# Patient Record
Sex: FEMALE | Race: WHITE | NOT HISPANIC OR LATINO | Employment: FULL TIME | URBAN - METROPOLITAN AREA
[De-identification: names, ages, dates, MRNs, and addresses within clinical notes are randomized per-mention and may not be internally consistent; named-entity substitution may affect disease eponyms.]

---

## 2023-05-12 DIAGNOSIS — Z00.00 ANNUAL PHYSICAL EXAM: ICD-10-CM

## 2023-05-13 ENCOUNTER — LAB (OUTPATIENT)
Dept: LAB | Facility: LAB | Age: 53
End: 2023-05-13
Payer: COMMERCIAL

## 2023-05-13 DIAGNOSIS — Z00.00 ANNUAL PHYSICAL EXAM: ICD-10-CM

## 2023-05-13 PROCEDURE — 80061 LIPID PANEL: CPT

## 2023-05-13 PROCEDURE — 36415 COLL VENOUS BLD VENIPUNCTURE: CPT

## 2023-05-13 PROCEDURE — 85025 COMPLETE CBC W/AUTO DIFF WBC: CPT

## 2023-05-13 PROCEDURE — 80053 COMPREHEN METABOLIC PANEL: CPT

## 2023-05-13 PROCEDURE — 84443 ASSAY THYROID STIM HORMONE: CPT

## 2023-05-14 LAB
ALANINE AMINOTRANSFERASE (SGPT) (U/L) IN SER/PLAS: 12 U/L (ref 7–45)
ALBUMIN (G/DL) IN SER/PLAS: 4.3 G/DL (ref 3.4–5)
ALKALINE PHOSPHATASE (U/L) IN SER/PLAS: 46 U/L (ref 33–110)
ANION GAP IN SER/PLAS: 10 MMOL/L (ref 10–20)
ASPARTATE AMINOTRANSFERASE (SGOT) (U/L) IN SER/PLAS: 19 U/L (ref 9–39)
BASOPHILS (10*3/UL) IN BLOOD BY AUTOMATED COUNT: 0.06 X10E9/L (ref 0–0.1)
BASOPHILS/100 LEUKOCYTES IN BLOOD BY AUTOMATED COUNT: 0.9 % (ref 0–2)
BILIRUBIN TOTAL (MG/DL) IN SER/PLAS: 0.5 MG/DL (ref 0–1.2)
CALCIUM (MG/DL) IN SER/PLAS: 9.8 MG/DL (ref 8.6–10.6)
CARBON DIOXIDE, TOTAL (MMOL/L) IN SER/PLAS: 29 MMOL/L (ref 21–32)
CHLORIDE (MMOL/L) IN SER/PLAS: 104 MMOL/L (ref 98–107)
CHOLESTEROL (MG/DL) IN SER/PLAS: 216 MG/DL (ref 0–199)
CHOLESTEROL IN HDL (MG/DL) IN SER/PLAS: 62.7 MG/DL
CHOLESTEROL/HDL RATIO: 3.4
CREATININE (MG/DL) IN SER/PLAS: 0.9 MG/DL (ref 0.5–1.05)
EOSINOPHILS (10*3/UL) IN BLOOD BY AUTOMATED COUNT: 0.27 X10E9/L (ref 0–0.7)
EOSINOPHILS/100 LEUKOCYTES IN BLOOD BY AUTOMATED COUNT: 4.1 % (ref 0–6)
ERYTHROCYTE DISTRIBUTION WIDTH (RATIO) BY AUTOMATED COUNT: 12.2 % (ref 11.5–14.5)
ERYTHROCYTE MEAN CORPUSCULAR HEMOGLOBIN CONCENTRATION (G/DL) BY AUTOMATED: 32.1 G/DL (ref 32–36)
ERYTHROCYTE MEAN CORPUSCULAR VOLUME (FL) BY AUTOMATED COUNT: 89 FL (ref 80–100)
ERYTHROCYTES (10*6/UL) IN BLOOD BY AUTOMATED COUNT: 4.57 X10E12/L (ref 4–5.2)
GFR FEMALE: 76 ML/MIN/1.73M2
GLUCOSE (MG/DL) IN SER/PLAS: 81 MG/DL (ref 74–99)
HEMATOCRIT (%) IN BLOOD BY AUTOMATED COUNT: 40.5 % (ref 36–46)
HEMOGLOBIN (G/DL) IN BLOOD: 13 G/DL (ref 12–16)
IMMATURE GRANULOCYTES/100 LEUKOCYTES IN BLOOD BY AUTOMATED COUNT: 0.3 % (ref 0–0.9)
LDL: 137 MG/DL (ref 0–99)
LEUKOCYTES (10*3/UL) IN BLOOD BY AUTOMATED COUNT: 6.6 X10E9/L (ref 4.4–11.3)
LYMPHOCYTES (10*3/UL) IN BLOOD BY AUTOMATED COUNT: 1.66 X10E9/L (ref 1.2–4.8)
LYMPHOCYTES/100 LEUKOCYTES IN BLOOD BY AUTOMATED COUNT: 25.3 % (ref 13–44)
MONOCYTES (10*3/UL) IN BLOOD BY AUTOMATED COUNT: 0.43 X10E9/L (ref 0.1–1)
MONOCYTES/100 LEUKOCYTES IN BLOOD BY AUTOMATED COUNT: 6.6 % (ref 2–10)
NEUTROPHILS (10*3/UL) IN BLOOD BY AUTOMATED COUNT: 4.11 X10E9/L (ref 1.2–7.7)
NEUTROPHILS/100 LEUKOCYTES IN BLOOD BY AUTOMATED COUNT: 62.8 % (ref 40–80)
NRBC (PER 100 WBCS) BY AUTOMATED COUNT: 0 /100 WBC (ref 0–0)
PLATELETS (10*3/UL) IN BLOOD AUTOMATED COUNT: 193 X10E9/L (ref 150–450)
POTASSIUM (MMOL/L) IN SER/PLAS: 4.6 MMOL/L (ref 3.5–5.3)
PROTEIN TOTAL: 7.2 G/DL (ref 6.4–8.2)
SODIUM (MMOL/L) IN SER/PLAS: 138 MMOL/L (ref 136–145)
THYROTROPIN (MIU/L) IN SER/PLAS BY DETECTION LIMIT <= 0.05 MIU/L: 3.38 MIU/L (ref 0.44–3.98)
TRIGLYCERIDE (MG/DL) IN SER/PLAS: 80 MG/DL (ref 0–149)
UREA NITROGEN (MG/DL) IN SER/PLAS: 17 MG/DL (ref 6–23)
VLDL: 16 MG/DL (ref 0–40)

## 2023-05-16 PROBLEM — N92.6 IRREGULAR MENSES: Status: ACTIVE | Noted: 2023-05-16

## 2023-05-16 PROBLEM — R92.8 ABNORMAL MAMMOGRAM: Status: ACTIVE | Noted: 2023-05-16

## 2023-05-16 PROBLEM — R10.9 ABDOMINAL PAIN: Status: ACTIVE | Noted: 2023-05-16

## 2023-05-16 RX ORDER — MULTIVITAMIN
TABLET ORAL
COMMUNITY
Start: 2022-05-16

## 2023-05-16 RX ORDER — LORATADINE 10 MG/1
10 TABLET ORAL
COMMUNITY
Start: 2022-05-16

## 2023-05-19 ENCOUNTER — OFFICE VISIT (OUTPATIENT)
Dept: PRIMARY CARE | Facility: CLINIC | Age: 53
End: 2023-05-19
Payer: COMMERCIAL

## 2023-05-19 VITALS
DIASTOLIC BLOOD PRESSURE: 60 MMHG | HEIGHT: 66 IN | WEIGHT: 142 LBS | BODY MASS INDEX: 22.82 KG/M2 | TEMPERATURE: 97.2 F | SYSTOLIC BLOOD PRESSURE: 105 MMHG | HEART RATE: 74 BPM

## 2023-05-19 DIAGNOSIS — Z12.11 COLON CANCER SCREENING: ICD-10-CM

## 2023-05-19 DIAGNOSIS — Z00.00 ROUTINE GENERAL MEDICAL EXAMINATION AT A HEALTH CARE FACILITY: Primary | ICD-10-CM

## 2023-05-19 PROBLEM — N92.6 IRREGULAR MENSES: Status: RESOLVED | Noted: 2023-05-16 | Resolved: 2023-05-19

## 2023-05-19 PROBLEM — R10.9 ABDOMINAL PAIN: Status: RESOLVED | Noted: 2023-05-16 | Resolved: 2023-05-19

## 2023-05-19 PROCEDURE — 99396 PREV VISIT EST AGE 40-64: CPT | Performed by: INTERNAL MEDICINE

## 2023-05-19 PROCEDURE — 1036F TOBACCO NON-USER: CPT | Performed by: INTERNAL MEDICINE

## 2023-05-19 ASSESSMENT — PATIENT HEALTH QUESTIONNAIRE - PHQ9
SUM OF ALL RESPONSES TO PHQ9 QUESTIONS 1 AND 2: 0
2. FEELING DOWN, DEPRESSED OR HOPELESS: NOT AT ALL
1. LITTLE INTEREST OR PLEASURE IN DOING THINGS: NOT AT ALL

## 2023-05-19 ASSESSMENT — COLUMBIA-SUICIDE SEVERITY RATING SCALE - C-SSRS
6. HAVE YOU EVER DONE ANYTHING, STARTED TO DO ANYTHING, OR PREPARED TO DO ANYTHING TO END YOUR LIFE?: NO
1. IN THE PAST MONTH, HAVE YOU WISHED YOU WERE DEAD OR WISHED YOU COULD GO TO SLEEP AND NOT WAKE UP?: NO
2. HAVE YOU ACTUALLY HAD ANY THOUGHTS OF KILLING YOURSELF?: NO

## 2023-05-19 NOTE — PROGRESS NOTES
Subjective   Patient ID: Diana Colindres is a 52 y.o. female who presents for Annual Exam.  HPI  Patient is here for annual check-up    Last well check 1 yr    Reported health good    Dental  check  reg     Vision check reg   Vision issues contacts   Hearing issues no  Vaccines UTD  will check    Diet healthy  eats less   Exercise tries  reg sits more   Caffeine occ soda  Alcohol 1 to 2 a week  Tobacco never    Colon cancer screening  needs colonosc    Current issues: none she has been feeling well she exercises regularly she occasionally visits chiropractor her diet is healthy she has no other issues she is up-to-date on well woman care and she needs colonoscopy    We reviewed blood work cholesterol still slightly higher than work on low-fat food    Review of Systems  GENERAL - Denies fever, fatigue or chills  SKIN - Denies rash, new skin lesions, or change in moles  EYES - Denies blurred vision, or change in visual acuity  EARS - Denies ear pain, discharge, ringing, or difficulty hearing  NOSE - Denies nasal congestion, discharge, or bleeding  MOUTH - Denies sore throat, postnasal drip or painful/difficulty swallowing  NECK - Denies pain or swelling  RESPIRATORY - Denies shortness of breath, cough, wheezing  CARDIOVASCULAR - Denies palpitations, chest pain, orthopnea, peripheral edema, syncope or claudication  GASTROINTESTINAL - Denies nausea, vomiting, diarrhea, constipation, abdominal pain, melena and or bright red blood  GENITOURINARY - Denies dysuria, frequency of urination, urgency, or hesitancy  MUSCULOSKELETAL - Denies joint or muscle pain, or back pain  NEUROLOGICAL - Denies localized numbness, weakness, or tingling  PSYCHIATRIC - Denies depression, anxiety, substance abuse, suicidal or homicidal ideation  ENDOCRINE - Denies heat or cold intolerance, weight loss or gain, increasing thirst  HEMATO-IMMUNOLOGIC - Denies easy bruising, bleeding, oral ulcerations or recurrent infections      Objective   BP  "105/60   Pulse 74   Temp 36.2 °C (97.2 °F)   Ht 1.676 m (5' 6\")   Wt 64.4 kg (142 lb)   BMI 22.92 kg/m²    Physical Exam  CONSTITUTIONAL - well nourished, well developed, looks like stated age, in no acute distress, not ill-appearing, and not tired appearing  SKIN - normal skin color and pigmentation, normal skin turgor without rash, lesions, or nodules visualized  HEAD - no trauma, normocephalic  EYES  -   ENT - TM's intact, no injection, no exudate,  nasal passage without discharge and patent  NECK - supple without rigidity, no neck mass was observed, no thyromegaly or thyroid nodules  CHEST - clear to auscultation, no wheezing, no crackles and no rales, good effort  CARDIAC - regular rate and regular rhythm, no skipped beats, no murmur  ABDOMEN - no organomegaly, soft, nontender, nondistended, normal bowel sounds, no guarding/rebound/rigidity  EXTREMITIES - no edema, no deformities  NEUROLOGICAL -    PSYCHIATRIC - alert, pleasant and cordial, age-appropriate  LYMPHATIC- no cervical lymphadenopathy    Assessment/Plan   Diagnoses and all orders for this visit:  Routine general medical examination at a health care facility  Colon cancer screening  -     Referral to Gastroenterology; Future  Watch cholesterol in the diet low-fat high-fiber diet  Blood work reviewed  Patient is doing well  Call with issues  Follow-up with me annually  Order colonoscopy  Adriana Bee MD   "

## 2024-01-02 ENCOUNTER — OFFICE (OUTPATIENT)
Dept: URBAN - METROPOLITAN AREA CLINIC 27 | Facility: CLINIC | Age: 54
End: 2024-01-02

## 2024-01-02 VITALS
HEART RATE: 66 BPM | DIASTOLIC BLOOD PRESSURE: 51 MMHG | WEIGHT: 146 LBS | HEIGHT: 66 IN | SYSTOLIC BLOOD PRESSURE: 109 MMHG

## 2024-01-02 DIAGNOSIS — Z12.11 ENCOUNTER FOR SCREENING FOR MALIGNANT NEOPLASM OF COLON: ICD-10-CM

## 2024-01-02 PROCEDURE — 99203 OFFICE O/P NEW LOW 30 MIN: CPT | Performed by: INTERNAL MEDICINE

## 2024-03-15 VITALS
DIASTOLIC BLOOD PRESSURE: 69 MMHG | SYSTOLIC BLOOD PRESSURE: 120 MMHG | DIASTOLIC BLOOD PRESSURE: 69 MMHG | HEART RATE: 65 BPM | DIASTOLIC BLOOD PRESSURE: 50 MMHG | RESPIRATION RATE: 18 BRPM | RESPIRATION RATE: 17 BRPM | DIASTOLIC BLOOD PRESSURE: 69 MMHG | SYSTOLIC BLOOD PRESSURE: 127 MMHG | TEMPERATURE: 97.6 F | DIASTOLIC BLOOD PRESSURE: 52 MMHG | WEIGHT: 130 LBS | RESPIRATION RATE: 12 BRPM | SYSTOLIC BLOOD PRESSURE: 99 MMHG | DIASTOLIC BLOOD PRESSURE: 51 MMHG | HEART RATE: 76 BPM | OXYGEN SATURATION: 97 % | HEART RATE: 60 BPM | RESPIRATION RATE: 18 BRPM | HEART RATE: 75 BPM | RESPIRATION RATE: 17 BRPM | SYSTOLIC BLOOD PRESSURE: 120 MMHG | SYSTOLIC BLOOD PRESSURE: 99 MMHG | SYSTOLIC BLOOD PRESSURE: 97 MMHG | HEART RATE: 83 BPM | DIASTOLIC BLOOD PRESSURE: 51 MMHG | SYSTOLIC BLOOD PRESSURE: 112 MMHG | DIASTOLIC BLOOD PRESSURE: 51 MMHG | SYSTOLIC BLOOD PRESSURE: 92 MMHG | SYSTOLIC BLOOD PRESSURE: 112 MMHG | OXYGEN SATURATION: 97 % | OXYGEN SATURATION: 97 % | SYSTOLIC BLOOD PRESSURE: 112 MMHG | DIASTOLIC BLOOD PRESSURE: 50 MMHG | SYSTOLIC BLOOD PRESSURE: 93 MMHG | SYSTOLIC BLOOD PRESSURE: 99 MMHG | HEART RATE: 76 BPM | DIASTOLIC BLOOD PRESSURE: 60 MMHG | SYSTOLIC BLOOD PRESSURE: 97 MMHG | HEART RATE: 70 BPM | OXYGEN SATURATION: 100 % | HEART RATE: 75 BPM | RESPIRATION RATE: 12 BRPM | RESPIRATION RATE: 18 BRPM | WEIGHT: 130 LBS | HEIGHT: 66 IN | DIASTOLIC BLOOD PRESSURE: 78 MMHG | HEART RATE: 71 BPM | DIASTOLIC BLOOD PRESSURE: 52 MMHG | RESPIRATION RATE: 13 BRPM | DIASTOLIC BLOOD PRESSURE: 52 MMHG | HEART RATE: 83 BPM | HEART RATE: 65 BPM | OXYGEN SATURATION: 100 % | OXYGEN SATURATION: 98 % | SYSTOLIC BLOOD PRESSURE: 127 MMHG | RESPIRATION RATE: 17 BRPM | HEART RATE: 60 BPM | HEIGHT: 66 IN | HEART RATE: 70 BPM | DIASTOLIC BLOOD PRESSURE: 78 MMHG | HEART RATE: 76 BPM | SYSTOLIC BLOOD PRESSURE: 92 MMHG | SYSTOLIC BLOOD PRESSURE: 120 MMHG | SYSTOLIC BLOOD PRESSURE: 97 MMHG | SYSTOLIC BLOOD PRESSURE: 92 MMHG | RESPIRATION RATE: 12 BRPM | HEART RATE: 83 BPM | DIASTOLIC BLOOD PRESSURE: 60 MMHG | HEART RATE: 65 BPM | SYSTOLIC BLOOD PRESSURE: 93 MMHG | DIASTOLIC BLOOD PRESSURE: 60 MMHG | HEART RATE: 75 BPM | TEMPERATURE: 97.6 F | DIASTOLIC BLOOD PRESSURE: 78 MMHG | DIASTOLIC BLOOD PRESSURE: 50 MMHG | RESPIRATION RATE: 13 BRPM | HEART RATE: 71 BPM | OXYGEN SATURATION: 100 % | HEART RATE: 71 BPM | WEIGHT: 130 LBS | SYSTOLIC BLOOD PRESSURE: 93 MMHG | OXYGEN SATURATION: 98 % | HEART RATE: 70 BPM | TEMPERATURE: 97.6 F | HEIGHT: 66 IN | SYSTOLIC BLOOD PRESSURE: 127 MMHG | HEART RATE: 60 BPM | RESPIRATION RATE: 13 BRPM | OXYGEN SATURATION: 98 %

## 2024-03-22 ENCOUNTER — AMBULATORY SURGICAL CENTER (OUTPATIENT)
Dept: URBAN - METROPOLITAN AREA SURGERY 12 | Facility: SURGERY | Age: 54
End: 2024-03-22
Payer: COMMERCIAL

## 2024-03-22 DIAGNOSIS — K57.30 DIVERTICULOSIS OF LARGE INTESTINE WITHOUT PERFORATION OR ABS: ICD-10-CM

## 2024-03-22 DIAGNOSIS — Z12.11 ENCOUNTER FOR SCREENING FOR MALIGNANT NEOPLASM OF COLON: ICD-10-CM

## 2024-03-22 DIAGNOSIS — K64.8 OTHER HEMORRHOIDS: ICD-10-CM

## 2024-03-22 PROCEDURE — G0121 COLON CA SCRN NOT HI RSK IND: HCPCS | Performed by: INTERNAL MEDICINE

## 2024-05-24 ENCOUNTER — APPOINTMENT (OUTPATIENT)
Dept: PRIMARY CARE | Facility: CLINIC | Age: 54
End: 2024-05-24
Payer: COMMERCIAL

## 2024-06-27 DIAGNOSIS — Z00.00 ROUTINE GENERAL MEDICAL EXAMINATION AT A HEALTH CARE FACILITY: ICD-10-CM

## 2024-06-27 DIAGNOSIS — Z12.31 VISIT FOR SCREENING MAMMOGRAM: ICD-10-CM

## 2024-07-05 ENCOUNTER — LAB (OUTPATIENT)
Dept: LAB | Facility: LAB | Age: 54
End: 2024-07-05
Payer: COMMERCIAL

## 2024-07-05 DIAGNOSIS — N95.1 MENOPAUSAL AND FEMALE CLIMACTERIC STATES: Primary | ICD-10-CM

## 2024-07-05 DIAGNOSIS — Z00.00 ROUTINE GENERAL MEDICAL EXAMINATION AT A HEALTH CARE FACILITY: ICD-10-CM

## 2024-07-05 LAB
ALBUMIN SERPL BCP-MCNC: 4.1 G/DL (ref 3.4–5)
ALP SERPL-CCNC: 48 U/L (ref 33–110)
ALT SERPL W P-5'-P-CCNC: 14 U/L (ref 7–45)
ANION GAP SERPL CALC-SCNC: 12 MMOL/L (ref 10–20)
AST SERPL W P-5'-P-CCNC: 29 U/L (ref 9–39)
BASOPHILS # BLD AUTO: 0.08 X10*3/UL (ref 0–0.1)
BASOPHILS NFR BLD AUTO: 1 %
BILIRUB SERPL-MCNC: 0.5 MG/DL (ref 0–1.2)
BUN SERPL-MCNC: 18 MG/DL (ref 6–23)
CALCIUM SERPL-MCNC: 9.3 MG/DL (ref 8.6–10.6)
CHLORIDE SERPL-SCNC: 103 MMOL/L (ref 98–107)
CHOLEST SERPL-MCNC: 206 MG/DL (ref 0–199)
CHOLESTEROL/HDL RATIO: 3.4
CO2 SERPL-SCNC: 29 MMOL/L (ref 21–32)
CREAT SERPL-MCNC: 0.9 MG/DL (ref 0.5–1.05)
EGFRCR SERPLBLD CKD-EPI 2021: 76 ML/MIN/1.73M*2
EOSINOPHIL # BLD AUTO: 0.34 X10*3/UL (ref 0–0.7)
EOSINOPHIL NFR BLD AUTO: 4.4 %
ERYTHROCYTE [DISTWIDTH] IN BLOOD BY AUTOMATED COUNT: 13 % (ref 11.5–14.5)
ESTRADIOL SERPL-MCNC: <19 PG/ML
FSH SERPL-ACNC: 63.6 IU/L
GLUCOSE SERPL-MCNC: 84 MG/DL (ref 74–99)
HCT VFR BLD AUTO: 35.1 % (ref 36–46)
HCV AB SER QL: NONREACTIVE
HDLC SERPL-MCNC: 61 MG/DL
HGB BLD-MCNC: 12.1 G/DL (ref 12–16)
IMM GRANULOCYTES # BLD AUTO: 0.03 X10*3/UL (ref 0–0.7)
IMM GRANULOCYTES NFR BLD AUTO: 0.4 % (ref 0–0.9)
LDLC SERPL CALC-MCNC: 128 MG/DL
LYMPHOCYTES # BLD AUTO: 2.15 X10*3/UL (ref 1.2–4.8)
LYMPHOCYTES NFR BLD AUTO: 28.1 %
MCH RBC QN AUTO: 29.8 PG (ref 26–34)
MCHC RBC AUTO-ENTMCNC: 34.5 G/DL (ref 32–36)
MCV RBC AUTO: 87 FL (ref 80–100)
MONOCYTES # BLD AUTO: 0.57 X10*3/UL (ref 0.1–1)
MONOCYTES NFR BLD AUTO: 7.4 %
NEUTROPHILS # BLD AUTO: 4.49 X10*3/UL (ref 1.2–7.7)
NEUTROPHILS NFR BLD AUTO: 58.7 %
NON HDL CHOLESTEROL: 145 MG/DL (ref 0–149)
NRBC BLD-RTO: 0 /100 WBCS (ref 0–0)
PLATELET # BLD AUTO: 198 X10*3/UL (ref 150–450)
POTASSIUM SERPL-SCNC: 3.8 MMOL/L (ref 3.5–5.3)
PROT SERPL-MCNC: 6.6 G/DL (ref 6.4–8.2)
RBC # BLD AUTO: 4.06 X10*6/UL (ref 4–5.2)
SODIUM SERPL-SCNC: 140 MMOL/L (ref 136–145)
T4 FREE SERPL-MCNC: 1.1 NG/DL (ref 0.78–1.48)
TRIGL SERPL-MCNC: 85 MG/DL (ref 0–149)
TSH SERPL-ACNC: 4.53 MIU/L (ref 0.44–3.98)
VLDL: 17 MG/DL (ref 0–40)
WBC # BLD AUTO: 7.7 X10*3/UL (ref 4.4–11.3)

## 2024-07-05 PROCEDURE — 86803 HEPATITIS C AB TEST: CPT

## 2024-07-05 PROCEDURE — 80053 COMPREHEN METABOLIC PANEL: CPT

## 2024-07-05 PROCEDURE — 84443 ASSAY THYROID STIM HORMONE: CPT

## 2024-07-05 PROCEDURE — 82670 ASSAY OF TOTAL ESTRADIOL: CPT

## 2024-07-05 PROCEDURE — 83001 ASSAY OF GONADOTROPIN (FSH): CPT

## 2024-07-05 PROCEDURE — 36415 COLL VENOUS BLD VENIPUNCTURE: CPT

## 2024-07-05 PROCEDURE — 84439 ASSAY OF FREE THYROXINE: CPT

## 2024-07-05 PROCEDURE — 85025 COMPLETE CBC W/AUTO DIFF WBC: CPT

## 2024-07-05 PROCEDURE — 80061 LIPID PANEL: CPT

## 2024-07-15 ENCOUNTER — APPOINTMENT (OUTPATIENT)
Dept: PRIMARY CARE | Facility: CLINIC | Age: 54
End: 2024-07-15
Payer: COMMERCIAL

## 2024-07-15 VITALS
SYSTOLIC BLOOD PRESSURE: 102 MMHG | HEART RATE: 67 BPM | BODY MASS INDEX: 24.43 KG/M2 | OXYGEN SATURATION: 98 % | WEIGHT: 152 LBS | HEIGHT: 66 IN | TEMPERATURE: 97.8 F | DIASTOLIC BLOOD PRESSURE: 66 MMHG

## 2024-07-15 DIAGNOSIS — R79.9 ABNORMAL BLOOD CHEMISTRY: ICD-10-CM

## 2024-07-15 DIAGNOSIS — E78.00 ELEVATED LDL CHOLESTEROL LEVEL: ICD-10-CM

## 2024-07-15 DIAGNOSIS — Z00.00 ROUTINE GENERAL MEDICAL EXAMINATION AT A HEALTH CARE FACILITY: Primary | ICD-10-CM

## 2024-07-15 PROCEDURE — 1036F TOBACCO NON-USER: CPT | Performed by: INTERNAL MEDICINE

## 2024-07-15 PROCEDURE — 99396 PREV VISIT EST AGE 40-64: CPT | Performed by: INTERNAL MEDICINE

## 2024-07-15 ASSESSMENT — ENCOUNTER SYMPTOMS
OCCASIONAL FEELINGS OF UNSTEADINESS: 0
LOSS OF SENSATION IN FEET: 0
DEPRESSION: 0

## 2024-07-15 ASSESSMENT — PATIENT HEALTH QUESTIONNAIRE - PHQ9
SUM OF ALL RESPONSES TO PHQ9 QUESTIONS 1 AND 2: 0
1. LITTLE INTEREST OR PLEASURE IN DOING THINGS: NOT AT ALL
2. FEELING DOWN, DEPRESSED OR HOPELESS: NOT AT ALL

## 2024-07-15 NOTE — PROGRESS NOTES
"Subjective   Patient ID: Diana [facility] is a 53 y.o. female who presents for Annual Exam (EP.  Physical Exam.  Labs done.  No concerns.).  HPI  Patient is here for annual check-up    Last well check  1 yr     Reported health good    Dental  check  reg     Vision check reg   Vision issues n  Hearing issues n  Vaccines UTD  y    Diet healthy   Exercise reg  Caffeine min  Alcohol min  Tobacco never     Colon cancer screening  utd 2024    Current issues:  here for annual     Review of Systems  GENERAL - Denies fever, fatigue or chills  SKIN - Denies rash, new skin lesions, or change in moles  EYES - Denies blurred vision, or change in visual acuity  EARS - Denies ear pain, discharge, ringing, or difficulty hearing  NOSE - Denies nasal congestion, discharge, or bleeding  MOUTH - Denies sore throat, postnasal drip or painful/difficulty swallowing  NECK - Denies pain or swelling  RESPIRATORY - Denies shortness of breath, cough, wheezing  CARDIOVASCULAR - Denies palpitations, chest pain, orthopnea, peripheral edema, syncope or claudication  GASTROINTESTINAL - Denies nausea, vomiting, diarrhea, constipation, abdominal pain, melena and or bright red blood  GENITOURINARY - Denies dysuria, frequency of urination, urgency, or hesitancy  MUSCULOSKELETAL - Denies joint or muscle pain, or back pain  NEUROLOGICAL - Denies localized numbness, weakness, or tingling  PSYCHIATRIC - Denies depression, anxiety, substance abuse, suicidal or homicidal ideation  ENDOCRINE - Denies heat or cold intolerance, weight loss or gain, increasing thirst  HEMATO-IMMUNOLOGIC - Denies easy bruising, bleeding, oral ulcerations or recurrent infections     Objective   /66   Pulse 67   Temp 36.6 °C (97.8 °F) (Oral)   Ht 1.676 m (5' 6\")   Wt 68.9 kg (152 lb)   SpO2 98%   BMI 24.53 kg/m²    Physical Exam  CONSTITUTIONAL - well nourished, well developed, looks like stated age, in no acute distress, not ill-appearing, and not tired " appearing  SKIN - normal skin color and pigmentation, normal skin turgor without rash, lesions, or nodules visualized  HEAD - no trauma, normocephalic  EYES  -   ENT - TM's intact, no injection, no exudate,  nasal passage without discharge and patent  NECK - supple without rigidity, no neck mass was observed, no thyromegaly or thyroid nodules  CHEST - clear to auscultation, no wheezing, no crackles and no rales, good effort  CARDIAC - regular rate and regular rhythm, no skipped beats, no murmur  ABDOMEN - no organomegaly, soft, nontender, nondistended, normal bowel sounds, no guarding/rebound/rigidity  EXTREMITIES - no edema, no deformities  NEUROLOGICAL -  nl b no deficits   PSYCHIATRIC - alert, pleasant and cordial, age-appropriate  LYMPHATIC- no cervical lymphadenopathy    Assessment/Plan   Diagnoses and all orders for this visit:  Routine general medical examination at a health care facility  Abnormal blood chemistry  -     Thyroid Stimulating Hormone; Future  -     Thyroxine, Free; Future  -     Triiodothyronine, Free; Future  Elevated LDL cholesterol level  -     Referral to Nutrition Services; Future    Reviewed blood work  Refer to nutrition for slightly elevated LDL cholesterol  She will recheck her thyroid in 8 weeks and the orders are in for that  She will call with issues  Keep annual follow-up appointments    Adriana Bee MD

## 2024-10-08 ENCOUNTER — NUTRITION (OUTPATIENT)
Dept: NUTRITION | Facility: CLINIC | Age: 54
End: 2024-10-08
Payer: COMMERCIAL

## 2024-10-08 VITALS — HEIGHT: 66 IN | WEIGHT: 154.98 LBS | BODY MASS INDEX: 24.91 KG/M2

## 2024-10-08 DIAGNOSIS — E78.00 ELEVATED LDL CHOLESTEROL LEVEL: ICD-10-CM

## 2024-10-08 PROCEDURE — 97802 MEDICAL NUTRITION INDIV IN: CPT | Performed by: DIETITIAN, REGISTERED

## 2024-10-08 NOTE — PATIENT INSTRUCTIONS
"1) Aim to have three meals and 1 -2 snacks per day. Eating three meals per day can increase the metabolism, this is called the thermal effect of eating. Strive to consume breakfast within 1 -2 hours of waking to jump start the metabolism. Aim for breakfast at 7:00, snack at 11:00, lunch at 2:00, dinner at 6:00 and a bedtime snack at 9:00 pm.   2) At meals and snacks strive to include a heart healthy protein. Strive to include protein at the meals and even snacks. Protein at meals can increase the metabolism too.  Protein at snacks can sustain energy, stabilize blood glucose, and provide more contentment.   3) It is recommended to consume 25 grams of fiber per day to help reduce cholesterol. To help meet this recommendation, aim to consume 2 fruits and 3 cups of vegetables per day and whole grain at one meal (oatmeal, brown rice, wild rice, whole grain pasta, whole grain bread, or Trisket Crackers).   4) Strive to pay attention to the added sugar within foods. The recommendation for women is to limit added sugar to less than 35 grams per day. To identify a low added sugar food, review the %Daily Value as 5% or less is considered a low added sugar food.   5) The plate method was recommended to help portion foods at meals and to structure. Using a 9\" plate, recommended for 1/2 of the plate to include non-starchy vegetable and suggested to consume this first.  Recommended protein to be included but limited to 3 ounces at meals. Recommended 1/4 of the plate or 1 cup of a grain or starch with maybe a  fruit, milk or yogurt at meals. For more information on the plate method visit myplate.gov.     Educational Handouts/Practices: Plate Method     Linda London, MS, RDN, LD, JUANA   Advanced Practice Clinical Dietitian  [email or web address]  Schedule line [phone]  Direct line [phone]       "

## 2024-10-14 ENCOUNTER — OFFICE VISIT (OUTPATIENT)
Dept: PRIMARY CARE | Facility: CLINIC | Age: 54
End: 2024-10-14
Payer: COMMERCIAL

## 2024-10-14 VITALS
SYSTOLIC BLOOD PRESSURE: 102 MMHG | OXYGEN SATURATION: 98 % | HEART RATE: 64 BPM | HEIGHT: 66 IN | BODY MASS INDEX: 24.43 KG/M2 | DIASTOLIC BLOOD PRESSURE: 66 MMHG | WEIGHT: 152 LBS | TEMPERATURE: 98 F

## 2024-10-14 DIAGNOSIS — J04.0 ACUTE LARYNGITIS: Primary | ICD-10-CM

## 2024-10-14 DIAGNOSIS — H10.33 ACUTE BACTERIAL CONJUNCTIVITIS OF BOTH EYES: ICD-10-CM

## 2024-10-14 PROCEDURE — 1036F TOBACCO NON-USER: CPT | Performed by: INTERNAL MEDICINE

## 2024-10-14 PROCEDURE — 99213 OFFICE O/P EST LOW 20 MIN: CPT | Performed by: INTERNAL MEDICINE

## 2024-10-14 PROCEDURE — 3008F BODY MASS INDEX DOCD: CPT | Performed by: INTERNAL MEDICINE

## 2024-10-14 RX ORDER — AZITHROMYCIN 250 MG/1
TABLET, FILM COATED ORAL
Qty: 6 TABLET | Refills: 0 | Status: SHIPPED | OUTPATIENT
Start: 2024-10-14 | End: 2024-10-19

## 2024-10-14 RX ORDER — MOXIFLOXACIN 5 MG/ML
1 SOLUTION/ DROPS OPHTHALMIC 3 TIMES DAILY
Qty: 3 ML | Refills: 0 | Status: SHIPPED | OUTPATIENT
Start: 2024-10-14 | End: 2024-10-21

## 2024-10-14 ASSESSMENT — ENCOUNTER SYMPTOMS
LOSS OF SENSATION IN FEET: 0
DEPRESSION: 0
OCCASIONAL FEELINGS OF UNSTEADINESS: 0

## 2024-10-14 NOTE — PROGRESS NOTES
"Subjective   Patient ID: [facility] is a 54 y.o. female who presents for Cough (EP.  Cough, congestion, eye redness. 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  Dry cough Sunday and coughing fits now.).  HPI     Has been sick    Started w dry cough and st 1 week ago   Now has red eyes and pressure   Was in around a lot  dust 3  days ago    Has a productive cough w white mucus    Eyes are watery  and has sinus  pressure     Review of Systems  Review of systems was performed and is otherwise negative except as noted in HPI.  Objective   /66   Pulse 64   Temp 36.7 °C (98 °F) (Oral)   Ht 1.676 m (5' 6\")   Wt 68.9 kg (152 lb)   SpO2 98%   BMI 24.53 kg/m²    Physical Exam  HEENT slight red oropharynx nasal drainage clear nares red bilaterally fluid posterior to bilateral TMs  Lungs clear harsh cough  Heart is regular rate rhythm no murmurs  Lower extremities no edema    Assessment/Plan   Diagnoses and all orders for this visit:  Acute laryngitis  -     azithromycin (Zithromax) 250 mg tablet; Take 2 tablets (500 mg) by mouth once daily for 1 day, THEN 1 tablet (250 mg) once daily for 4 days. Take 2 tabs (500 mg) by mouth today, than 1 daily for 4 days..  Acute bacterial conjunctivitis of both eyes  -     moxifloxacin (Vigamox) 0.5 % ophthalmic solution; Administer 1 drop into both eyes 3 times a day for 7 days.    Call with issues  DARIA-Salazar  Vigamox eyedrops  Call with issues    Adriana Bee MD   "

## 2024-11-15 ENCOUNTER — TELEMEDICINE CLINICAL SUPPORT (OUTPATIENT)
Dept: NUTRITION | Facility: CLINIC | Age: 54
End: 2024-11-15
Payer: COMMERCIAL

## 2024-11-15 DIAGNOSIS — E78.00 ELEVATED LDL CHOLESTEROL LEVEL: Primary | ICD-10-CM

## 2024-11-15 PROCEDURE — 97803 MED NUTRITION INDIV SUBSEQ: CPT | Mod: 95 | Performed by: DIETITIAN, REGISTERED

## 2024-11-15 NOTE — PROGRESS NOTES
Reason for Nutrition Visit:  Pt is a 54 y.o. female being seen [facility]. Pt was referred by Dr. Adriana Bee effective 7/14/24.     1. Elevated LDL cholesterol level [E78.00]             Past Medical Hx:  Patient Active Problem List   Diagnosis    Abnormal mammogram        Current Outpatient Medications:     loratadine (Claritin) 10 mg tablet, Take 1 tablet (10 mg) by mouth., Disp: , Rfl:     multivitamin tablet, Take by mouth., Disp: , Rfl:      Anthropometrics:      Weight change:    Significant Weight Change: No   lbs   10/2024 Weight: 154 lbs   11/2024 Weight: 150 lbs     Lab Results   Component Value Date    CHOL 206 (H) 07/05/2024    LDLF 137 (H) 05/13/2023    TRIG 85 07/05/2024    HDL 61.0 07/05/2024          Chemistry    Lab Results   Component Value Date/Time     07/05/2024 0727    K 3.8 07/05/2024 0727     07/05/2024 0727    CO2 29 07/05/2024 0727    BUN 18 07/05/2024 0727    CREATININE 0.90 07/05/2024 0727    Lab Results   Component Value Date/Time    CALCIUM 9.3 07/05/2024 0727    ALKPHOS 48 07/05/2024 0727    AST 29 07/05/2024 0727    ALT 14 07/05/2024 0727    BILITOT 0.5 07/05/2024 0727        Food and Nutrition Hx:  Pt states she would like to lose wt. She is used to going long periods of time without eating.   Pt states she has energy during the day. Pt wakes up at 6:00- 6:30 am.   She reports feeling cold.     Pt had a follow-up appointment. She wants to lose wt.   She has been trying to eat more. She is not always hungry for dinner. She has been consuming 2 cups of vegetables and at least an apple or banana. She has not been eating whole grain, she stays away from bread.     24 Diet Recall:  Meal 1: Breakfast is at 7:00 to include 10 ounces of skim or 1% milk with [employer] Instant breakfast (kcal 250, sat fat 3, added sugar 19)   Snack is at 10:30 to include celery with 1 T peanut butter or a fruit.   Meal 2: Lunch is at 1:30 -2:00 to include a salad with 2 cup of lettuce with  "Ranch D[employer]. Sometimes she may have protein such as chicken or eggs. She can have a slice of whole grain pumpernickel bread.   Meal 3: Dinner may be a cheese stick or 1 cup of pasta with or without meat sauce   Snack may be at 8:00 pm to include a glass of skim milk.   Snacks:  Beverages: 2 cups of milk, 64 ounces of water. Sometimes a juice is consumed and this may be consumed 1 -2 times a month.      Allergies: onions   Intolerance: None  Appetite: Good  Intake: >75%  GI Symptoms : None   Swallowing Difficulty: No problems with swallowing  Dentition : own    Types of Activities: Walking  10 minutes 2 times a day with a total of 10,000 steps. Heart rate is elevated.     Sleep duration/quality : 5-6 hours and disrupted sleep. Pt wakes up at midnight and stays up until 2:00 am.   Sleep disorders: none    Supplements: Multivitamin daily    Feelings of Hunger?: Doesn't notice  Physical Feeling: Does not feel fullness  Binging: Never  Cravings: Sweet  Energy Levels: Stable    Food Preparation: Patient  Cooking Skills/Barriers: None reported  Grocery Shopping: Patient    Nutrition Focused Physical Exam:    Performed/Deferred: Deferred due to be being virtual visit    Estimated Energy Needs:  Energy Needs   Calculated Energy Needs Using Equations  Height: 167.6 cm (5' 5.98\")  Weight Used for Equation Calculations: 70.3 kg (154 lb 15.7 oz)  Ozzy Yang Equation (Overweight or Obese Patients): 1314  Equation Chosen to Use by RD: Rob Santiago  Activity Factor: 1.3  Total Energy Needs: 1708  Estimated Energy Needs  Total Energy Estimated Needs (kCal): 1200 kCal  Total Estimated Energy Need per Day (kCal/kg): 1 kCal/kg  Method for Estimating Needs: Actual wt was used to calculate protein needs.  Estimated Protein Needs  Total Protein Estimated Needs (g): 70 g  Total Protein Estimated Needs (g/kg): 1 g/kg    Nutrition Diagnosis:    Nutrition Diagnosis     Patient has Nutrition Diagnosis Yes   Diagnosis Status (1) " Improving    Nutrition Diagnosis 1 Food and nutrition related knowledge deficit   Related to (1) the desire to lose weight with BMI at 25   As Evidenced by (1) reports by pt of the need to learn.   Additional Nutrition Diagnosis Diagnosis 2   Diagnosis Status (2) Improving    Nutrition Diagnosis 2 Altered nutrition related to laboratory values   Related to (2) limited fiber, limited omega three fatty acids, and at times diet high in saturated   As Evidenced by (2) LDL cholesterol hsa been above recommendation.   Nutrition Diagnosis 3 Inadequate protein intake-Improving    Related to (3) lack of meal and snack schedule and structure with meals that can omit protein   As Evidenced by (3) protein intake does not meet daily needs.       Nutrition Interventions:  Medical nutrition therapy was given for LDL cholesterol.   Nutrition Counseling: Motivational Interviewing and CBT  Coordination of Care: None    Nutrition Recommendations:  1,200 calories per day for 1 lb wt loss per week. Increase awareness and response to hunger and satiety. Heart healthy meal plan with a low saturated fat intake to <5 -6 % of energy (less than 8 grams of saturated fat per day), reduced intake of added sugars (<10% of total energy), 25 grams of fiber with intake of viscous fiber to 5 g/day to 10 g/day, plant sterols/stanols to 2 g/day, 1.1 gram of omega three fatty acids, and a 1,500 mg sodium restriction.     Nutrition Goals:  Via teach back method patient verbalized understanding of the following topics:  1) Continue to eat three meals and 1 snack per day. Plan to always eat dinner at 8:00 pm, set an alarm.  2) Aim to eat a heart healthy protein at all meals and snacks. Eating protein at meals and more throughout the day can help to increase the metabolism. At dinner consider least consume a cup of skim milk with a plant based protein powder. Always add egg whites or chicken to the salad and peanut butter to the fruit.   3) Aim to focus on  reducing the amount of saturated. Limit saturated to about 8 grams per day. Strive to select low fat or non dressings, use of oil and vinegar, switch from whole egg to egg white, from regular protein drink to plant based varieties such OWYN.   4) Use the label to help identify heart healthy foods. Examine saturated fat, trans fat, fiber, and added sugar. Select a low saturated fat, trans fat and low added sugar foods by selecting foods that are 5% of less of the Daily Value.     Educational Handouts/Practices: Heart healthy Label Reading   Plate Method  Next session: movement, reset breaths     Linda London, MS, RDN, LD, JUANA, MB-EAT-P  Advanced Practice Clinical Dietitian   Mindfulness-Based Eating Awareness Training Practitioner  [facility]   [facility] [place]   [email or web address]  Scheduling Line [phone]  Direct Line [phone]    Readiness to Change : Good  Level of Understanding : Good  Anticipated Compliant : Good

## 2024-12-19 ENCOUNTER — APPOINTMENT (OUTPATIENT)
Dept: NUTRITION | Facility: CLINIC | Age: 54
End: 2024-12-19
Payer: COMMERCIAL

## 2025-02-07 ENCOUNTER — APPOINTMENT (OUTPATIENT)
Dept: NUTRITION | Facility: CLINIC | Age: 55
End: 2025-02-07
Payer: COMMERCIAL

## 2025-02-07 DIAGNOSIS — E78.00 ELEVATED LDL CHOLESTEROL LEVEL: Primary | ICD-10-CM

## 2025-02-07 PROCEDURE — 97803 MED NUTRITION INDIV SUBSEQ: CPT | Mod: 95 | Performed by: DIETITIAN, REGISTERED

## 2025-02-07 PROCEDURE — 97803 MED NUTRITION INDIV SUBSEQ: CPT | Performed by: DIETITIAN, REGISTERED

## 2025-02-07 NOTE — PROGRESS NOTES
Reason for Nutrition Visit:  Pt is a 54 y.o. female being seen [place]. Pt was referred by Dr. Adriana Bee effective 7/14/24.     1. Elevated LDL cholesterol level [E78.00]             Past Medical Hx:  Patient Active Problem List   Diagnosis    Abnormal mammogram        Current Outpatient Medications:     loratadine (Claritin) 10 mg tablet, Take 1 tablet (10 mg) by mouth., Disp: , Rfl:     multivitamin tablet, Take by mouth., Disp: , Rfl:      Anthropometrics:      Weight change:    Significant Weight Change: No   lbs   10/2024 Weight: 154 lbs   11/2024 Weight: 150 lbs   02/2025 Weight: 152 lbs     Lab Results   Component Value Date    CHOL 206 (H) 07/05/2024    LDLF 137 (H) 05/13/2023    TRIG 85 07/05/2024    HDL 61.0 07/05/2024          Chemistry    Lab Results   Component Value Date/Time     07/05/2024 0727    K 3.8 07/05/2024 0727     07/05/2024 0727    CO2 29 07/05/2024 0727    BUN 18 07/05/2024 0727    CREATININE 0.90 07/05/2024 0727    Lab Results   Component Value Date/Time    CALCIUM 9.3 07/05/2024 0727    ALKPHOS 48 07/05/2024 0727    AST 29 07/05/2024 0727    ALT 14 07/05/2024 0727    BILITOT 0.5 07/05/2024 0727        Food and Nutrition Hx:  Pt states she would like to lose wt. She is used to going long periods of time without eating.   Pt states she has energy during the day. Pt wakes up at 6:00- 6:30 am.   She reports feeling cold.     Pt had a follow-up appointment.   BMI is in a normal range.   PT has been started to work through her shoulder.   She has been including protein at all meals. She has obtained the OWYN.   She was losing a few pounds prior to her son's injury.     24 Diet Recall:  Meal 1: Breakfast is at 7:00 to include 1 scoop of OWYN, 1 cup of skim milk, and 0.5 banana (protein 18).   Snack is at 10:30 to include a slice of  bread with 1 T peanut butter (protein 5)  Meal 2: Lunch is at 1:30 -2:00 to include a salad with 2 cups of salad, carrots, 2 tsp of sunflower  "seeds (protein 2)   Meal 3: Dinner may be skipped, milk or normally noodles with beef (protein 8-21).   Snack may be at 8:00 pm to include a glass of skim milk.   Snacks:  Beverages: 2 cups of milk, 64 ounces of water. Sometimes a juice is consumed and this may be consumed 1 -2 times a month.      Allergies: onions   Intolerance: None  Appetite: Good  Intake: >75%  GI Symptoms : None   Swallowing Difficulty: No problems with swallowing  Dentition : own    Types of Activities: Walking  10 minutes 2 times a day with a total of 10,000 steps. Heart rate is elevated.     Sleep duration/quality : 5-6 hours and disrupted sleep. Pt wakes up at midnight and stays up until 2:00 am.   Sleep disorders: none    Supplements: Multivitamin daily    Feelings of Hunger?: Doesn't notice  Physical Feeling: Does not feel fullness  Binging: Never  Cravings: Sweet  Energy Levels: Stable    Food Preparation: Patient  Cooking Skills/Barriers: None reported  Grocery Shopping: Patient    Nutrition Focused Physical Exam:    Performed/Deferred: Deferred due to be being virtual visit    Estimated Energy Needs:  Energy Needs   Calculated Energy Needs Using Equations  Height: 167.6 cm (5' 5.98\")  Weight Used for Equation Calculations: 70.3 kg (154 lb 15.7 oz)  Ozzy Yang Equation (Overweight or Obese Patients): 1314  Equation Chosen to Use by RD: Rob Santiago  Activity Factor: 1.3  Total Energy Needs: 1708  Estimated Energy Needs  Total Energy Estimated Needs (kCal): 1200 kCal  Total Estimated Energy Need per Day (kCal/kg): 1 kCal/kg  Method for Estimating Needs: Actual wt was used to calculate protein needs.  Estimated Protein Needs  Total Protein Estimated Needs (g): 70 g  Total Protein Estimated Needs (g/kg): 1 g/kg    Nutrition Diagnosis:    Nutrition Diagnosis     Patient has Nutrition Diagnosis Yes   Diagnosis Status (1) Resolved   Nutrition Diagnosis 1 Food and nutrition related knowledge deficit   Related to (1) the desire to " lose weight with BMI at 25   Additional Nutrition Diagnosis Diagnosis 2   Diagnosis Status (2) Active   Nutrition Diagnosis 2 Altered nutrition related to laboratory values   Related to (2) limited fiber, limited omega three fatty acids, and at times diet high in saturated   As Evidenced by (2) LDL cholesterol hsa been above recommendation.   Diagnosis Status (3) Active   Nutrition Diagnosis 3 Inadequate protein intake   Related to (3) lack of meal and snack schedule and structure with meals that can omit protein   As Evidenced by (3) protein intake does not meet daily needs.     Nutrition Interventions:  Medical nutrition therapy was given for LDL cholesterol.   Nutrition Counseling: Motivational Interviewing and CBT  Coordination of Care: None    Nutrition Recommendations:  1,200 calories per day for 1 lb wt loss per week. Increase awareness and response to hunger and satiety. Heart healthy meal plan with a low saturated fat intake to <5 -6 % of energy (less than 8 grams of saturated fat per day), reduced intake of added sugars (<10% of total energy), 25 grams of fiber with intake of viscous fiber to 5 g/day to 10 g/day, plant sterols/stanols to 2 g/day, 1.1 gram of omega three fatty acids, and a 1,500 mg sodium restriction.     Nutrition Goals:  Via teach back method patient verbalized understanding of the following topics:  1) Aim to eat a heart healthy protein at all meals and snacks. Strive to always include a good amount of protein for the salad such as adding 3 ounces of tuna (~protein 20).  2) Use the handout to help to continue to identify heart healthy foods. Continue to focus on selecting heart healthy foods.   3) Focus now more plant based meals maybe aiming for one plant based meal per day.   4) Strive to focus on moving more once you are done with PT. The recommendation for exercise and health is to participate in 150 minutes of moderate exercise per week. Consider walking/movement 5 days a week for  30 minutes or more.     Educational Handouts/Practices: Cholesterol Lowering Nutrition Therapy   Heart healthy Label Reading   Plate Method    Linda London, MS, RDN, LD, JUANA, MB-EAT-P  Advanced Practice Clinical Dietitian   Mindfulness-Based Eating Awareness Training Practitioner  [facility]   [facility] [place]   [email or web address]  Scheduling Line [phone]  Direct Line [phone]    Readiness to Change : Good  Level of Understanding : Good  Anticipated Compliant : Good

## 2025-03-10 ENCOUNTER — HOSPITAL ENCOUNTER (OUTPATIENT)
Dept: RADIOLOGY | Facility: CLINIC | Age: 55
Discharge: HOME | End: 2025-03-10
Payer: COMMERCIAL

## 2025-03-10 DIAGNOSIS — M54.2 CERVICALGIA: ICD-10-CM

## 2025-03-10 PROCEDURE — 72050 X-RAY EXAM NECK SPINE 4/5VWS: CPT

## 2025-03-10 PROCEDURE — 72050 X-RAY EXAM NECK SPINE 4/5VWS: CPT | Performed by: RADIOLOGY

## 2025-03-17 ENCOUNTER — OFFICE VISIT (OUTPATIENT)
Dept: PRIMARY CARE | Facility: CLINIC | Age: 55
End: 2025-03-17
Payer: COMMERCIAL

## 2025-03-17 VITALS
HEART RATE: 64 BPM | SYSTOLIC BLOOD PRESSURE: 104 MMHG | WEIGHT: 154.5 LBS | BODY MASS INDEX: 24.83 KG/M2 | OXYGEN SATURATION: 97 % | DIASTOLIC BLOOD PRESSURE: 70 MMHG | RESPIRATION RATE: 18 BRPM | HEIGHT: 66 IN | TEMPERATURE: 97.5 F

## 2025-03-17 DIAGNOSIS — R30.0 BURNING WITH URINATION: ICD-10-CM

## 2025-03-17 DIAGNOSIS — R35.0 URINARY FREQUENCY: Primary | ICD-10-CM

## 2025-03-17 DIAGNOSIS — N30.00 ACUTE CYSTITIS WITHOUT HEMATURIA: ICD-10-CM

## 2025-03-17 LAB
POC APPEARANCE, URINE: CLEAR
POC BILIRUBIN, URINE: NEGATIVE
POC BLOOD, URINE: NEGATIVE
POC COLOR, URINE: ABNORMAL
POC GLUCOSE, URINE: ABNORMAL MG/DL
POC KETONES, URINE: NEGATIVE MG/DL
POC LEUKOCYTES, URINE: NEGATIVE
POC NITRITE,URINE: POSITIVE
POC PH, URINE: 6 PH
POC PROTEIN, URINE: ABNORMAL MG/DL
POC SPECIFIC GRAVITY, URINE: 1.01
POC UROBILINOGEN, URINE: 1 EU/DL

## 2025-03-17 PROCEDURE — 3008F BODY MASS INDEX DOCD: CPT | Performed by: FAMILY MEDICINE

## 2025-03-17 PROCEDURE — 99213 OFFICE O/P EST LOW 20 MIN: CPT | Performed by: FAMILY MEDICINE

## 2025-03-17 PROCEDURE — 81003 URINALYSIS AUTO W/O SCOPE: CPT | Performed by: FAMILY MEDICINE

## 2025-03-17 PROCEDURE — 1036F TOBACCO NON-USER: CPT | Performed by: FAMILY MEDICINE

## 2025-03-17 RX ORDER — SULFAMETHOXAZOLE AND TRIMETHOPRIM 800; 160 MG/1; MG/1
1 TABLET ORAL 2 TIMES DAILY
Qty: 6 TABLET | Refills: 0 | Status: SHIPPED | OUTPATIENT
Start: 2025-03-17 | End: 2025-03-20

## 2025-03-17 ASSESSMENT — PATIENT HEALTH QUESTIONNAIRE - PHQ9
1. LITTLE INTEREST OR PLEASURE IN DOING THINGS: NOT AT ALL
SUM OF ALL RESPONSES TO PHQ9 QUESTIONS 1 AND 2: 0
2. FEELING DOWN, DEPRESSED OR HOPELESS: NOT AT ALL

## 2025-03-17 NOTE — PROGRESS NOTES
"Subjective   [facility] 54 [facility] who presents for UTI (Saturday urgency, burning with urination ,no blood in urine , had 3 UTI's in the last month, taking AZO).  HPI  She did not see anyone for the urinary symptoms/it was just her own diagnosis, been taking Azo  \"slight burning\" and urinary frequency   Feels like she drinks a lot of water  Denies back pain or abd pain   No urinary incontinence    No new personal products   Did start new protein powder and noticed symptoms when she started that powder  Objective   /70   Pulse 64   Temp 36.4 °C (97.5 °F)   Resp 18   Ht 1.676 m (5' 6\")   Wt 70.1 kg (154 lb 8 oz)   SpO2 97%   BMI 24.94 kg/m²    Physical Exam  Alert, well-appearing.    CVS: RRR.    Respiratory: Clear and equal breath sounds.    GI: Soft, nontender    Back: No CVA tenderness.  Assessment/Plan   Diagnoses and all orders for this visit:  Urinary frequency  Burning with urination  -     POCT UA Automated manually resulted  Acute cystitis without hematuria  -     sulfamethoxazole-trimethoprim (Bactrim DS) 800-160 mg tablet; Take 1 tablet by mouth 2 times a day for 3 days.    Start Bactrim while awaiting urine culture results     Stephie Ayala MD  Family Medicine   [facility]  "

## 2025-03-20 LAB — BACTERIA UR CULT: ABNORMAL

## 2025-06-10 ENCOUNTER — OFFICE VISIT (OUTPATIENT)
Dept: PRIMARY CARE | Facility: CLINIC | Age: 55
End: 2025-06-10
Payer: COMMERCIAL

## 2025-06-10 VITALS
HEIGHT: 66 IN | BODY MASS INDEX: 24.91 KG/M2 | OXYGEN SATURATION: 99 % | SYSTOLIC BLOOD PRESSURE: 102 MMHG | TEMPERATURE: 97.9 F | DIASTOLIC BLOOD PRESSURE: 68 MMHG | WEIGHT: 155 LBS | HEART RATE: 57 BPM

## 2025-06-10 DIAGNOSIS — R30.0 DYSURIA: ICD-10-CM

## 2025-06-10 LAB
POC APPEARANCE, URINE: ABNORMAL
POC BILIRUBIN, URINE: NEGATIVE
POC BLOOD, URINE: ABNORMAL
POC COLOR, URINE: YELLOW
POC GLUCOSE, URINE: NEGATIVE MG/DL
POC KETONES, URINE: NEGATIVE MG/DL
POC LEUKOCYTES, URINE: ABNORMAL
POC NITRITE,URINE: POSITIVE
POC PH, URINE: 7.5 PH
POC PROTEIN, URINE: NEGATIVE MG/DL
POC SPECIFIC GRAVITY, URINE: 1.01
POC UROBILINOGEN, URINE: 0.2 EU/DL

## 2025-06-10 PROCEDURE — 99213 OFFICE O/P EST LOW 20 MIN: CPT | Performed by: INTERNAL MEDICINE

## 2025-06-10 PROCEDURE — 1036F TOBACCO NON-USER: CPT | Performed by: INTERNAL MEDICINE

## 2025-06-10 PROCEDURE — 3008F BODY MASS INDEX DOCD: CPT | Performed by: INTERNAL MEDICINE

## 2025-06-10 PROCEDURE — 81003 URINALYSIS AUTO W/O SCOPE: CPT | Performed by: INTERNAL MEDICINE

## 2025-06-10 RX ORDER — CIPROFLOXACIN 250 MG/1
250 TABLET, FILM COATED ORAL 2 TIMES DAILY
Qty: 10 TABLET | Refills: 0 | Status: SHIPPED | OUTPATIENT
Start: 2025-06-10 | End: 2025-06-15

## 2025-06-10 RX ORDER — CRANBERRY FRUIT 450 MG
TABLET ORAL
COMMUNITY

## 2025-06-10 RX ORDER — PHENAZOPYRIDINE HYDROCHLORIDE 95 MG/1
95 TABLET ORAL 3 TIMES DAILY PRN
COMMUNITY

## 2025-06-10 RX ORDER — CETIRIZINE HYDROCHLORIDE 10 MG/1
TABLET, CHEWABLE ORAL DAILY
COMMUNITY

## 2025-06-10 ASSESSMENT — PATIENT HEALTH QUESTIONNAIRE - PHQ9
2. FEELING DOWN, DEPRESSED OR HOPELESS: NOT AT ALL
1. LITTLE INTEREST OR PLEASURE IN DOING THINGS: NOT AT ALL
SUM OF ALL RESPONSES TO PHQ9 QUESTIONS 1 AND 2: 0

## 2025-06-10 ASSESSMENT — ENCOUNTER SYMPTOMS
LOSS OF SENSATION IN FEET: 0
OCCASIONAL FEELINGS OF UNSTEADINESS: 0
DEPRESSION: 0

## 2025-06-10 NOTE — PROGRESS NOTES
"Subjective   Patient ID: Diana Fallon is a 54 y.o. female who presents for UTI (EP.  Uti-frequency, frequent uti's, took azo.  Was previously urinating blood with last uti.).  HPI    History of Present Illness  Diana Cadet is a 54-year-old female who presents with bladder symptoms.    She experiences an acute onset of bladder symptoms, including burning and frequency, which began suddenly this morning. She has taken Azo, which has altered the color of her urine. No unusual odor is noted.    She is currently taking cranberry supplements, Zyrtec instead of Claritin, a multivitamin, and magnesium to help with hot flashes associated with menopause. She reports difficulty sleeping due to menopausal symptoms and is seeking a gynecologist for further management.  She denies flank pain low back pain or abdominal pain.  No fevers or chills.  No nausea vomiting or diarrhea.    Review of Systems  Review of systems was performed and is otherwise negative except as noted in HPI.      Objective   /68   Pulse 57   Temp 36.6 °C (97.9 °F) (Oral)   Ht 1.676 m (5' 6\")   Wt 70.3 kg (155 lb)   SpO2 99%   BMI 25.02 kg/m²      Physical Exam  HEENT is normal  Lungs clear bilaterally  Heart is regular rate rhythm no murmurs  Abdomen benign  No flank pain on palpation or low back pain pain on palpation    Assessment/Plan   Diagnoses and all orders for this visit:  Dysuria  -     POCT UA Automated manually resulted  -     Urine Culture  -     ciprofloxacin (Cipro) 250 mg tablet; Take 1 tablet (250 mg) by mouth 2 times a day for 5 days.    Assessment & Plan  Urinary tract infection  Acute onset of dysuria and urinary frequency, indicative of a urinary tract infection. Symptoms began this morning. She has started taking phenazopyridine, which may alter urine color.  - Send urine analysis to confirm diagnosis.  - Prescribe antibiotic for urinary tract infection.  - Call with urine analysis results.  - " [facility]     Menopausal symptoms  Experiencing insomnia due to menopausal symptoms, including hot flashes. She is under gynecological care and taking magnesium for symptom relief. Considering consultation with Leia Del Castillo, a specialist in menopausal care.  - Consider referral to Leia Del Castillo for specialized menopausal care.    Adriana Bee MD  This medical note was created with the assistance of artificial intelligence (AI) for documentation purposes. The content has been reviewed and confirmed by the healthcare provider for accuracy and completeness. Patient consented to the use of audio recording and use of AI during their visit.

## 2025-06-12 LAB — BACTERIA UR CULT: ABNORMAL

## 2025-06-25 DIAGNOSIS — R79.9 ABNORMAL BLOOD CHEMISTRY: ICD-10-CM

## 2025-06-25 DIAGNOSIS — E78.00 ELEVATED LDL CHOLESTEROL LEVEL: ICD-10-CM

## 2025-06-25 DIAGNOSIS — Z12.31 ENCOUNTER FOR SCREENING MAMMOGRAM FOR BREAST CANCER: ICD-10-CM

## 2025-06-25 DIAGNOSIS — Z00.00 ROUTINE GENERAL MEDICAL EXAMINATION AT A HEALTH CARE FACILITY: ICD-10-CM

## 2025-06-25 DIAGNOSIS — R79.89 ABNORMAL THYROID BLOOD TEST: ICD-10-CM

## 2025-06-25 DIAGNOSIS — Z13.0 SCREENING FOR BLOOD DISEASE: ICD-10-CM

## 2025-07-17 ENCOUNTER — APPOINTMENT (OUTPATIENT)
Dept: RADIOLOGY | Facility: CLINIC | Age: 55
End: 2025-07-17
Payer: COMMERCIAL

## 2025-07-17 VITALS — HEIGHT: 66 IN | BODY MASS INDEX: 24.91 KG/M2 | WEIGHT: 155 LBS

## 2025-07-17 DIAGNOSIS — Z12.31 ENCOUNTER FOR SCREENING MAMMOGRAM FOR BREAST CANCER: ICD-10-CM

## 2025-07-17 LAB
ALBUMIN SERPL-MCNC: 4.2 G/DL (ref 3.6–5.1)
ALP SERPL-CCNC: 46 U/L (ref 37–153)
ALT SERPL-CCNC: 16 U/L (ref 6–29)
ANION GAP SERPL CALCULATED.4IONS-SCNC: 8 MMOL/L (CALC) (ref 7–17)
AST SERPL-CCNC: 20 U/L (ref 10–35)
BASOPHILS # BLD AUTO: 68 CELLS/UL (ref 0–200)
BASOPHILS NFR BLD AUTO: 1.1 %
BILIRUB SERPL-MCNC: 0.4 MG/DL (ref 0.2–1.2)
BUN SERPL-MCNC: 17 MG/DL (ref 7–25)
CALCIUM SERPL-MCNC: 9.2 MG/DL (ref 8.6–10.4)
CHLORIDE SERPL-SCNC: 105 MMOL/L (ref 98–110)
CHOLEST SERPL-MCNC: 223 MG/DL
CHOLEST/HDLC SERPL: 3.8 (CALC)
CO2 SERPL-SCNC: 27 MMOL/L (ref 20–32)
CREAT SERPL-MCNC: 0.85 MG/DL (ref 0.5–1.03)
EGFRCR SERPLBLD CKD-EPI 2021: 81 ML/MIN/1.73M2
EOSINOPHIL # BLD AUTO: 260 CELLS/UL (ref 15–500)
EOSINOPHIL NFR BLD AUTO: 4.2 %
ERYTHROCYTE [DISTWIDTH] IN BLOOD BY AUTOMATED COUNT: 12.5 % (ref 11–15)
GLUCOSE SERPL-MCNC: 81 MG/DL (ref 65–99)
HCT VFR BLD AUTO: 40 % (ref 35–45)
HDLC SERPL-MCNC: 58 MG/DL
HGB BLD-MCNC: 13.3 G/DL (ref 11.7–15.5)
LDLC SERPL CALC-MCNC: 142 MG/DL (CALC)
LYMPHOCYTES # BLD AUTO: 1736 CELLS/UL (ref 850–3900)
LYMPHOCYTES NFR BLD AUTO: 28 %
MCH RBC QN AUTO: 29.2 PG (ref 27–33)
MCHC RBC AUTO-ENTMCNC: 33.3 G/DL (ref 32–36)
MCV RBC AUTO: 87.9 FL (ref 80–100)
MONOCYTES # BLD AUTO: 422 CELLS/UL (ref 200–950)
MONOCYTES NFR BLD AUTO: 6.8 %
NEUTROPHILS # BLD AUTO: 3714 CELLS/UL (ref 1500–7800)
NEUTROPHILS NFR BLD AUTO: 59.9 %
NONHDLC SERPL-MCNC: 165 MG/DL (CALC)
PLATELET # BLD AUTO: 181 THOUSAND/UL (ref 140–400)
PMV BLD REES-ECKER: 10.4 FL (ref 7.5–12.5)
POTASSIUM SERPL-SCNC: 4 MMOL/L (ref 3.5–5.3)
PROT SERPL-MCNC: 6.6 G/DL (ref 6.1–8.1)
RBC # BLD AUTO: 4.55 MILLION/UL (ref 3.8–5.1)
SODIUM SERPL-SCNC: 140 MMOL/L (ref 135–146)
T3FREE SERPL-MCNC: 3.5 PG/ML (ref 2.3–4.2)
T4 FREE SERPL-MCNC: 1.1 NG/DL (ref 0.8–1.8)
TRIGL SERPL-MCNC: 114 MG/DL
TSH SERPL-ACNC: 3.78 MIU/L
WBC # BLD AUTO: 6.2 THOUSAND/UL (ref 3.8–10.8)

## 2025-07-17 PROCEDURE — 77067 SCR MAMMO BI INCL CAD: CPT

## 2025-07-18 ENCOUNTER — APPOINTMENT (OUTPATIENT)
Dept: PRIMARY CARE | Facility: CLINIC | Age: 55
End: 2025-07-18
Payer: COMMERCIAL

## 2025-07-18 VITALS
OXYGEN SATURATION: 96 % | HEART RATE: 67 BPM | SYSTOLIC BLOOD PRESSURE: 106 MMHG | DIASTOLIC BLOOD PRESSURE: 72 MMHG | BODY MASS INDEX: 24.59 KG/M2 | TEMPERATURE: 97.4 F | WEIGHT: 153 LBS | HEIGHT: 66 IN

## 2025-07-18 DIAGNOSIS — Z00.00 ROUTINE GENERAL MEDICAL EXAMINATION AT A HEALTH CARE FACILITY: Primary | ICD-10-CM

## 2025-07-18 PROCEDURE — 99396 PREV VISIT EST AGE 40-64: CPT | Performed by: INTERNAL MEDICINE

## 2025-07-18 PROCEDURE — 3008F BODY MASS INDEX DOCD: CPT | Performed by: INTERNAL MEDICINE

## 2025-07-18 PROCEDURE — 1036F TOBACCO NON-USER: CPT | Performed by: INTERNAL MEDICINE

## 2025-07-18 ASSESSMENT — ENCOUNTER SYMPTOMS
OCCASIONAL FEELINGS OF UNSTEADINESS: 0
DEPRESSION: 0
LOSS OF SENSATION IN FEET: 0

## 2025-07-18 NOTE — PROGRESS NOTES
Subjective   Patient ID: Diana Fallon is a 54 y.o. female who presents for Annual Exam (EP.  Annual exam.  Labs done.  No concerns.  Mammo yesterday.).    HPI  Patient is here for annual check-up      History of Present Illness  Diana Cadet is a 54-year-old here for an annual well visit.    Interim History and Concerns: Diana is on a waitlist for a gynecologist appointment at [facility] in [place]. She has been trying to schedule since March and is considering seeing another provider, Dr. Mom at [facility].    She reports no eye or ear problems but uses 'cheaters' and has a contact lens for one eye that is stronger than the other.    She experiences hot flashes and associates them with alcohol consumption, which occurs about once a month.    Diana had a colonoscopy last year and undergoes annual mammograms, though records for 2023 and 2024 are missing.    She experiences sluggishness and has started taking a probiotic to help with regularity.    DIET: She is trying to eat more fiber.    ELIMINATION: Diana experiences constipation, typically having bowel movements every three days, but sometimes extending to six days. She uses probiotics and aloe pills to help regulate her bowel movements.    ORAL HEALTH: Her dental checkup is up to date, but she needs to find a new vision provider due to insurance changes.    ACTIVITIES: She does not exercise regularly and is trying to get motivated to start.    SUBSTANCE USE: She consumes alcohol about once a month and has no caffeine intake except for maybe a soda a week.    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    VISION/HEARING: She uses 'cheaters' and has a contact lens for one eye that is stronger than the other.       Last well  "check  1 yr     Reported health  good     Dental  check  reg     Vision check due   Vision issues close vision  Hearing issues n  Vaccines UTD  n    Diet healthy  Exercise no  Caffeine none   Alcohol once a month   Tobacco never     Colon cancer screening  2024 due 2031          Review of Systems  GENERAL - Denies fever, fatigue or chills  SKIN - Denies rash, new skin lesions, or change in moles  EYES - Denies blurred vision, or change in visual acuity  EARS - Denies ear pain, discharge, ringing, or difficulty hearing  NOSE - Denies nasal congestion, discharge, or bleeding  MOUTH - Denies sore throat, postnasal drip or painful/difficulty swallowing  NECK - Denies pain or swelling  RESPIRATORY - Denies shortness of breath, cough, wheezing  CARDIOVASCULAR - Denies palpitations, chest pain, orthopnea, peripheral edema, syncope or claudication  GASTROINTESTINAL - Denies nausea, vomiting, diarrhea, constipation, abdominal pain, melena and or bright red blood occ irritable if eats something wrong  or has onions uses probiotic   GENITOURINARY - Denies dysuria, frequency of urination, urgency, or hesitancy  MUSCULOSKELETAL - Denies joint or muscle pain, or back pain  NEUROLOGICAL - Denies localized numbness, weakness, or tingling  PSYCHIATRIC - Denies depression, anxiety, substance abuse, suicidal or homicidal ideation  ENDOCRINE - Denies heat or cold intolerance, weight loss or gain, increasing thirst  HEMATO-IMMUNOLOGIC - Denies easy bruising, bleeding, oral ulcerations or recurrent infections    Objective   /72   Pulse 67   Temp 36.3 °C (97.4 °F) (Oral)   Ht 1.676 m (5' 6\")   Wt 69.4 kg (153 lb)   SpO2 96%   BMI 24.69 kg/m²      Physical Exam  CONSTITUTIONAL - well nourished, well developed, looks like stated age, in no acute distress, not ill-appearing, and not tired appearing  SKIN - normal skin color and pigmentation, normal skin turgor without rash, lesions, or nodules visualized  HEAD - no trauma, " normocephalic  EYES - pupils are equal and reactive to light, extraocular muscles are intact, and normal external exam  ENT - TM's intact, no injection, no signs of infection, uvula midline, normal tongue movement and throat normal, no exudate, nasal passage without discharge and patent  NECK - supple without rigidity, no neck mass was observed, no thyromegaly or thyroid nodules  CHEST - clear to auscultation, no wheezing, no crackles and no rales, good effort  CARDIAC - regular rate and regular rhythm, no skipped beats, no murmur  ABDOMEN - no organomegaly, soft, nontender, nondistended, normal bowel sounds, no guarding/rebound/rigidity, negative McBurney sign and negative Altman sign  EXTREMITIES - no edema, no deformities  NEUROLOGICAL - normal gait, normal balance, normal motor, no ataxia, DTRs equal and symmetrical; alert, oriented and no focal signs  PSYCHIATRIC - alert, pleasant and cordial, age-appropriate  IMMUNOLOGIC - no cervical lymphadenopathy    Assessment/Plan   Diagnoses and all orders for this visit:  Routine general medical examination at a health care facility        Assessment & Plan  Urinary tract infection  Recurrent urinary tract infections.    Constipation  Chronic constipation with bowel movements every three to six days. Probiotic use has improved regularity.  - Continue probiotic supplementation.  - Increase dietary fiber intake.    Hyperlipidemia  Slightly elevated cholesterol levels with total cholesterol at 223 mg/dL and LDL at 142 mg/dL. HDL and triglycerides are within normal limits.  - Recommend dietary modifications to reduce fat intake and increase fiber.  - Monitor cholesterol levels regularly.       Fu annual   Call w vicente Bee MD    This medical note was created with the assistance of artificial intelligence (AI) for documentation purposes. The content has been reviewed and confirmed by the healthcare provider for accuracy and completeness. Patient consented to  the use of audio recording and use of AI during their visit.

## 2026-07-24 ENCOUNTER — APPOINTMENT (OUTPATIENT)
Dept: PRIMARY CARE | Facility: CLINIC | Age: 56
End: 2026-07-24
Payer: COMMERCIAL